# Patient Record
Sex: MALE | Race: WHITE | NOT HISPANIC OR LATINO | Employment: OTHER | URBAN - METROPOLITAN AREA
[De-identification: names, ages, dates, MRNs, and addresses within clinical notes are randomized per-mention and may not be internally consistent; named-entity substitution may affect disease eponyms.]

---

## 2017-03-03 NOTE — PATIENT DISCUSSION
S/P PE IOL, OU: DOING WELL. PATIENT INSTRUCTED TO DISCONTINUE ALL MEDICATED DROPS TODAY. SPECS RX OFFERED. RX ARC IN SPECS TO MINIMIZE GLARE. RETURN FOR FOLLOW-UP AS SCHEDULED.

## 2017-03-03 NOTE — PATIENT DISCUSSION
Continue: prednisolone acetate (prednisolone acetate): drops,suspension: 1% 1 drop once a day into left eye

## 2017-05-10 PROBLEM — Z98.890: Noted: 2017-05-10

## 2017-05-10 PROBLEM — Z94.7: Noted: 2017-05-10

## 2017-09-07 ENCOUNTER — FOLLOW UP (OUTPATIENT)
Dept: URBAN - METROPOLITAN AREA CLINIC 35 | Facility: CLINIC | Age: 67
End: 2017-09-07

## 2017-09-07 DIAGNOSIS — Z96.1: ICD-10-CM

## 2017-09-07 DIAGNOSIS — H25.11: ICD-10-CM

## 2017-09-07 PROCEDURE — G8785 BP SCRN NO PERF AT INTERVAL: HCPCS

## 2017-09-07 PROCEDURE — 92012 INTRM OPH EXAM EST PATIENT: CPT

## 2017-09-07 PROCEDURE — 1036F TOBACCO NON-USER: CPT

## 2017-09-07 PROCEDURE — G8428 CUR MEDS NOT DOCUMENT: HCPCS

## 2017-09-07 ASSESSMENT — VISUAL ACUITY
OD_BAT: 20/100
OS_SC: 20/40-2
OD_SC: 20/50-1

## 2017-09-07 ASSESSMENT — TONOMETRY
OD_IOP_MMHG: 16
OS_IOP_MMHG: 16

## 2017-09-19 ENCOUNTER — CATARACT EVALUATION (OUTPATIENT)
Dept: URBAN - METROPOLITAN AREA CLINIC 39 | Facility: CLINIC | Age: 67
End: 2017-09-19

## 2017-09-19 VITALS
DIASTOLIC BLOOD PRESSURE: 91 MMHG | HEART RATE: 76 BPM | HEIGHT: 60 IN | SYSTOLIC BLOOD PRESSURE: 144 MMHG | RESPIRATION RATE: 18 BRPM

## 2017-09-19 DIAGNOSIS — H35.3130: ICD-10-CM

## 2017-09-19 DIAGNOSIS — H25.811: ICD-10-CM

## 2017-09-19 PROCEDURE — 4177F TALK PT/CRGVR RE AREDS PREV: CPT

## 2017-09-19 PROCEDURE — 92134 CPTRZ OPH DX IMG PST SGM RTA: CPT

## 2017-09-19 PROCEDURE — G8952 PRE-HTN/HTN, NO F/U, NOT GVN: HCPCS

## 2017-09-19 PROCEDURE — 92014 COMPRE OPH EXAM EST PT 1/>: CPT

## 2017-09-19 PROCEDURE — 1036F TOBACCO NON-USER: CPT

## 2017-09-19 PROCEDURE — 92025-3 CORNEAL TOPO, REFUSED

## 2017-09-19 PROCEDURE — 2019F DILATED MACUL EXAM DONE: CPT

## 2017-09-19 PROCEDURE — 92136TC INTERFEROMETRY - TECHNICAL COMPONENT

## 2017-09-19 PROCEDURE — G8427 DOCREV CUR MEDS BY ELIG CLIN: HCPCS

## 2017-09-19 RX ORDER — MOXIFLOXACIN HYDROCHLORIDE 5 MG/ML: 1 SOLUTION/ DROPS OPHTHALMIC

## 2017-09-19 ASSESSMENT — VISUAL ACUITY
OD_GLARE: 20/100
OS_SC: J12
OS_SC: 20/50+2
OS_CC: 20/80
OD_SC: 20/80
OS_CC: J3
OD_SC: J3
OD_CC: J3
OD_CC: 20/25

## 2017-09-19 ASSESSMENT — TONOMETRY
OD_IOP_MMHG: 17
OS_IOP_MMHG: 18

## 2017-10-25 ENCOUNTER — SURGERY/PROCEDURE (OUTPATIENT)
Dept: URBAN - METROPOLITAN AREA CLINIC 39 | Facility: CLINIC | Age: 67
End: 2017-10-25

## 2017-10-25 DIAGNOSIS — H25.811: ICD-10-CM

## 2017-10-25 PROCEDURE — 66984 XCAPSL CTRC RMVL W/O ECP: CPT

## 2017-10-26 ENCOUNTER — CATARACT POST-OP 1-DAY (OUTPATIENT)
Dept: URBAN - METROPOLITAN AREA CLINIC 35 | Facility: CLINIC | Age: 67
End: 2017-10-26

## 2017-10-26 DIAGNOSIS — Z96.1: ICD-10-CM

## 2017-10-26 PROCEDURE — 99024 POSTOP FOLLOW-UP VISIT: CPT

## 2017-10-26 ASSESSMENT — VISUAL ACUITY
OD_SC: J12
OD_SC: 20/40
OS_SC: 20/30

## 2017-10-26 ASSESSMENT — TONOMETRY
OD_IOP_MMHG: 17
OS_IOP_MMHG: 18

## 2017-11-02 ENCOUNTER — POST-OP (OUTPATIENT)
Dept: URBAN - METROPOLITAN AREA CLINIC 35 | Facility: CLINIC | Age: 67
End: 2017-11-02

## 2017-11-02 DIAGNOSIS — Z96.1: ICD-10-CM

## 2017-11-02 PROCEDURE — 99024 POSTOP FOLLOW-UP VISIT: CPT

## 2017-11-02 RX ORDER — SODIUM CHLORIDE 50 MG/G: OINTMENT OPHTHALMIC EVERY EVENING

## 2017-11-02 ASSESSMENT — VISUAL ACUITY
OS_BAT: 20/100+1
OS_SC: 20/40+1
OD_SC: 20/30-2
OD_SC: J8
OS_SC: J12

## 2017-11-02 ASSESSMENT — KERATOMETRY
OD_AXISANGLE2_DEGREES: 100
OS_K2POWER_DIOPTERS: 43.50
OS_AXISANGLE_DEGREES: 145
OD_AXISANGLE_DEGREES: 010
OD_K2POWER_DIOPTERS: 43.75
OS_AXISANGLE2_DEGREES: 055
OS_K1POWER_DIOPTERS: 44.75
OD_K1POWER_DIOPTERS: 44.75

## 2017-11-02 ASSESSMENT — TONOMETRY
OS_IOP_MMHG: 17
OD_IOP_MMHG: 18

## 2017-11-27 ENCOUNTER — POST-OP (OUTPATIENT)
Dept: URBAN - METROPOLITAN AREA CLINIC 35 | Facility: CLINIC | Age: 67
End: 2017-11-27

## 2017-11-27 DIAGNOSIS — Z96.1: ICD-10-CM

## 2017-11-27 PROCEDURE — 99024 POSTOP FOLLOW-UP VISIT: CPT

## 2017-11-27 ASSESSMENT — KERATOMETRY
OD_K2POWER_DIOPTERS: 43.75
OD_AXISANGLE2_DEGREES: 100
OD_AXISANGLE_DEGREES: 010
OS_AXISANGLE_DEGREES: 145
OS_AXISANGLE2_DEGREES: 055
OS_K2POWER_DIOPTERS: 43.50
OS_K1POWER_DIOPTERS: 44.75
OD_K1POWER_DIOPTERS: 44.75

## 2017-11-27 ASSESSMENT — VISUAL ACUITY
OD_SC: 20/25
OS_SC: 20/50-2

## 2017-11-27 ASSESSMENT — TONOMETRY
OS_IOP_MMHG: 19
OD_IOP_MMHG: 13

## 2018-08-14 ENCOUNTER — ESTABLISHED COMPREHENSIVE EXAM (OUTPATIENT)
Dept: URBAN - METROPOLITAN AREA CLINIC 39 | Facility: CLINIC | Age: 68
End: 2018-08-14

## 2018-08-14 DIAGNOSIS — Z96.1: ICD-10-CM

## 2018-08-14 DIAGNOSIS — Z98.890: ICD-10-CM

## 2018-08-14 DIAGNOSIS — H35.3130: ICD-10-CM

## 2018-08-14 PROCEDURE — 92286 ANT SGM IMG I&R SPECLR MIC: CPT

## 2018-08-14 PROCEDURE — G8427 DOCREV CUR MEDS BY ELIG CLIN: HCPCS

## 2018-08-14 PROCEDURE — 92014 COMPRE OPH EXAM EST PT 1/>: CPT

## 2018-08-14 PROCEDURE — 92134 CPTRZ OPH DX IMG PST SGM RTA: CPT

## 2018-08-14 PROCEDURE — 92015 DETERMINE REFRACTIVE STATE: CPT

## 2018-08-14 PROCEDURE — 1036F TOBACCO NON-USER: CPT

## 2018-08-14 PROCEDURE — G8785 BP SCRN NO PERF AT INTERVAL: HCPCS

## 2018-08-14 PROCEDURE — G9903 PT SCRN TBCO ID AS NON USER: HCPCS

## 2018-08-14 ASSESSMENT — TONOMETRY
OS_IOP_MMHG: 18
OD_IOP_MMHG: 18

## 2018-08-14 ASSESSMENT — VISUAL ACUITY
OS_CC: J1+
OD_CC: J1+
OD_PH: 20/20-2
OD_SC: 20/40
OS_SC: 20/20-2
OD_CC: 20/25-1
OS_CC: 20/25+1

## 2018-08-14 ASSESSMENT — KERATOMETRY
OD_AXISANGLE_DEGREES: 010
OS_K2POWER_DIOPTERS: 43.50
OS_K1POWER_DIOPTERS: 44.75
OD_K1POWER_DIOPTERS: 44.75
OD_AXISANGLE2_DEGREES: 100
OS_AXISANGLE2_DEGREES: 055
OD_K2POWER_DIOPTERS: 43.75
OS_AXISANGLE_DEGREES: 145

## 2018-11-14 NOTE — PATIENT DISCUSSION
EYELID PTOSIS, OU:  ONLY TRANSIENT - SINGLE OCCURRENCE. PATIENT ADVISED TO RETURN ASAP IF SYMPTOMS ONSET AGAIN.

## 2022-07-14 ENCOUNTER — COMPREHENSIVE EXAM (OUTPATIENT)
Dept: URBAN - METROPOLITAN AREA CLINIC 35 | Facility: CLINIC | Age: 72
End: 2022-07-14

## 2022-07-14 DIAGNOSIS — H35.362: ICD-10-CM

## 2022-07-14 DIAGNOSIS — D31.31: ICD-10-CM

## 2022-07-14 DIAGNOSIS — Z96.1: ICD-10-CM

## 2022-07-14 DIAGNOSIS — H52.03: ICD-10-CM

## 2022-07-14 PROCEDURE — 92015 DETERMINE REFRACTIVE STATE: CPT

## 2022-07-14 PROCEDURE — 92134 CPTRZ OPH DX IMG PST SGM RTA: CPT

## 2022-07-14 PROCEDURE — 92014 COMPRE OPH EXAM EST PT 1/>: CPT

## 2022-07-14 PROCEDURE — 92250 FUNDUS PHOTOGRAPHY W/I&R: CPT

## 2022-07-14 ASSESSMENT — VISUAL ACUITY
OS_CC: J2
OS_CC: 20/30-
OD_CC: J1-
OU_CC: J1-
OD_CC: 20/25

## 2022-07-14 ASSESSMENT — KERATOMETRY
OS_K1POWER_DIOPTERS: 44.75
OD_AXISANGLE_DEGREES: 010
OS_AXISANGLE_DEGREES: 145
OD_AXISANGLE2_DEGREES: 100
OS_AXISANGLE2_DEGREES: 055
OS_K2POWER_DIOPTERS: 43.50
OD_K2POWER_DIOPTERS: 43.75
OD_K1POWER_DIOPTERS: 44.75

## 2022-07-14 ASSESSMENT — TONOMETRY
OS_IOP_MMHG: 20
OD_IOP_MMHG: 19
